# Patient Record
Sex: FEMALE | Race: WHITE | NOT HISPANIC OR LATINO | Employment: OTHER | URBAN - METROPOLITAN AREA
[De-identification: names, ages, dates, MRNs, and addresses within clinical notes are randomized per-mention and may not be internally consistent; named-entity substitution may affect disease eponyms.]

---

## 2017-01-11 ENCOUNTER — RESULTS ENCOUNTER (OUTPATIENT)
Dept: GASTROENTEROLOGY | Facility: CLINIC | Age: 82
End: 2017-01-11

## 2017-01-11 ENCOUNTER — TELEPHONE (OUTPATIENT)
Dept: GASTROENTEROLOGY | Facility: CLINIC | Age: 82
End: 2017-01-11

## 2017-01-11 DIAGNOSIS — R19.7 DIARRHEA, UNSPECIFIED TYPE: ICD-10-CM

## 2017-01-11 DIAGNOSIS — R19.7 DIARRHEA, UNSPECIFIED TYPE: Primary | ICD-10-CM

## 2017-01-11 RX ORDER — SACCHAROMYCES BOULARDII 250 MG
250 CAPSULE ORAL 2 TIMES DAILY
Qty: 60 CAPSULE | Refills: 3 | Status: SHIPPED | OUTPATIENT
Start: 2017-01-11 | End: 2020-10-01

## 2017-01-11 NOTE — TELEPHONE ENCOUNTER
"Call to pt.  States that for past 2-3 days has had watery orange diarrhea.  States cannot count how many, \"but its a lot.\"  States can't control.  Denies abd pain or fever.  States has no appetite - has not eaten today.  Has not tried any OTC rx  - states has no Imodium on hand.  Pt asking \"for something\" to control diarrhea.  Advise pt will send message to Dr Franco.  Pt verb understanding.  "

## 2017-01-11 NOTE — TELEPHONE ENCOUNTER
----- Message from Tatiana Perez sent at 1/11/2017 11:59 AM EST -----  Regarding: LOOSE STOOLS  Contact: [phone]  DR. PEARSON - PT CALLED STATED HAVING LOOSE STOOL AND ASKING FOR MED.. PHARM# [phone]

## 2017-01-11 NOTE — TELEPHONE ENCOUNTER
Recommend checking stool studies prior to starting medication - orders in chart - rec trial probiotic (florastor) in interim - sent to pharmacy

## 2017-01-11 NOTE — TELEPHONE ENCOUNTER
Called pt and advised per Dr Franco that she recommends checking stool studies prior to starting medication - she recommends to trial probiotic ( florastor) in the meantime and she has sent this to her pharmacy.  Advised would have stool kit at the  with her name on it.  Pt verb understanding and states her  will  the kit tomorrow.

## 2017-01-19 DIAGNOSIS — R19.7 DIARRHEA, UNSPECIFIED TYPE: Primary | ICD-10-CM

## 2017-01-20 LAB
C DIFF TOX A+B STL QL IA: NEGATIVE
NTI FECAL TRPT (PP ORANGE): NORMAL

## 2017-01-26 ENCOUNTER — TELEPHONE (OUTPATIENT)
Dept: GASTROENTEROLOGY | Facility: CLINIC | Age: 82
End: 2017-01-26

## 2017-01-26 NOTE — TELEPHONE ENCOUNTER
----- Message from Valorie Franco MD sent at 1/21/2017  8:42 AM EST -----  Call pt with results.  Your stool studies were normal (negative).

## 2020-10-01 ENCOUNTER — OFFICE VISIT (OUTPATIENT)
Dept: NEUROLOGY | Facility: CLINIC | Age: 85
End: 2020-10-01

## 2020-10-01 VITALS
DIASTOLIC BLOOD PRESSURE: 72 MMHG | HEIGHT: 61 IN | BODY MASS INDEX: 25.49 KG/M2 | OXYGEN SATURATION: 99 % | WEIGHT: 135 LBS | SYSTOLIC BLOOD PRESSURE: 110 MMHG | HEART RATE: 85 BPM

## 2020-10-01 DIAGNOSIS — R41.3 MEMORY LOSS: Primary | ICD-10-CM

## 2020-10-01 DIAGNOSIS — F02.80 LATE ONSET ALZHEIMER'S DISEASE WITHOUT BEHAVIORAL DISTURBANCE (HCC): ICD-10-CM

## 2020-10-01 DIAGNOSIS — G30.1 LATE ONSET ALZHEIMER'S DISEASE WITHOUT BEHAVIORAL DISTURBANCE (HCC): ICD-10-CM

## 2020-10-01 PROBLEM — K25.9 STOMACH ULCER: Status: ACTIVE | Noted: 2020-10-01

## 2020-10-01 PROBLEM — R00.0 TACHYCARDIA: Status: ACTIVE | Noted: 2019-08-28

## 2020-10-01 PROCEDURE — 99204 OFFICE O/P NEW MOD 45 MIN: CPT | Performed by: PSYCHIATRY & NEUROLOGY

## 2020-10-01 RX ORDER — OMEPRAZOLE 20 MG/1
20 CAPSULE, DELAYED RELEASE ORAL DAILY
COMMUNITY

## 2020-10-01 NOTE — PROGRESS NOTES
CC: [facility]    HPI:  Isabella Velez is a  87 y.o.  right-handed white female who was sent for neurological consultation by Iris Burnett NP regarding mild cognitive impairment.  The patient's  was with her on her evaluation.  Her history and his history do not match.  When asked about memory loss the patient states she does not have any trouble with her memory and she does not have any trouble medically at all.  According to her  she has had probably about 5 years of slowly progressive memory loss.  He states that the memory fluctuates a little bit but both agree that it does not seem to affect her activities of daily living at home.  In other words she still gets up, cleans up, dresses herself, cooks, cleans, does dishes and washes clothes.  She drives very little since her  has told her not to drive.  She stays at home mostly and he goes out to shop.  He takes care of the finances.  There are no unusual behaviors according to the  such as nocturnal restlessness or delusions or hallucinations.  The patient recalls no one in the family with memory loss.  She states her parents  probably in their late 80s but she did not have a specific age for either of them.  She had 3 brothers and 2 sisters some of whom are .  She states none of them had memory trouble that she recalls.  She denies any balance trouble or bowel or bladder dysfunction.    Reviewing her chart back in  she had a CT scan of the brain which was normal.  I reviewed the films and in fact there was really very little brain atrophy present.  She had some laboratory testing including a TSH which was normal at 2.97, Red cell folate of 1085 but the B12 level was low at 162.  Neither of them have any recollection of the B12 level being low and the patient states that she never got B12 shots or was put on B12 supplementation orally.  She denies being on any vitamins currently.  Upon review of labs there does not seem to be  history of macrocytic anemia with most recent CBC which I can find in the chart from 2016 showed hemoglobin 14 and MCV 89.    She denies a history of significant head trauma meningitis seizure stroke or syncope.  She denies family history of memory troubles stroke brain tumor brain hemorrhage or aneurysm of a brain artery.  Very specifically she denies having any motor vehicle accidents.  Her  who is seated next to her did not have any additional comments to make besides the memory loss history which the patient was in denial of.  The patient does have history of gastric ulcers and remains on omeprazole but other medications have been stopped including antihypertensives and a statin.  The patient apparently lost weight and blood pressure improved.    There is no particular complaint of sleep disturbances or daytime drowsiness.  Depression does not seem evident.    Past Medical History:   Diagnosis Date   • Anemia    • Arthritis    • Diverticulosis    • Esophagitis, Platter grade D    • GERD (gastroesophageal reflux disease)    • Hemorrhoids    • Hiatal hernia    • Hyperlipidemia    • Hypertension    • Multiple gastric ulcers          Past Surgical History:   Procedure Laterality Date   • CATARACT EXTRACTION     • CHOLECYSTECTOMY     • COLONOSCOPY  05/16/2008    hemorrhoids, tics   • COLONOSCOPY  07/05/2015    TA and TVA polyps, tics in sig colon   • ENDOSCOPY N/A 11/1/2016    Procedure: ESOPHAGOGASTRODUODENOSCOPY;  Surgeon: Valorie Franco MD;  Location: [facility] ENDOSCOPY;  Service:    • UPPER GASTROINTESTINAL ENDOSCOPY  07/01/2015    LA grade D reflux esophagitis, erythamous mucosa in stomach, path fibinopurulent del and granulation tissue consistent w/ ulcer bed, minimal squamouos mucosa w/ acute inflam   • UPPER GASTROINTESTINAL ENDOSCOPY  03/22/2015    HH, LA Grade D erosive esophagitis, gastric ulcers           Current Outpatient Medications:   •  omeprazole (priLOSEC) 20 MG capsule, Take 20 mg by  "mouth Daily., Disp: , Rfl:       History reviewed. No pertinent family history.      Social History     Socioeconomic History   • Marital status:      Spouse name: Not on file   • Number of children: Not on file   • Years of education: Not on file   • Highest education level: Not on file   Tobacco Use   • Smoking status: Never Smoker   • Smokeless tobacco: Never Used   Substance and Sexual Activity   • Alcohol use: No   • Drug use: No         Allergies   Allergen Reactions   • Penicillins          Pain Scale: 0/10        ROS:  Review of Systems   Constitutional: Negative for activity change, appetite change and fatigue.   HENT: Positive for sore throat. Negative for ear pain, facial swelling and hearing loss.    Eyes: Positive for itching. Negative for pain and redness.   Respiratory: Negative for cough, choking and shortness of breath.    Cardiovascular: Negative for chest pain and leg swelling.   Gastrointestinal: Negative for abdominal pain, nausea and vomiting.   Endocrine: Negative for cold intolerance and heat intolerance.   Skin: Negative for color change, pallor and rash.   Allergic/Immunologic: Negative for environmental allergies and food allergies.   Neurological: Negative for dizziness, tremors, seizures, syncope, facial asymmetry, speech difficulty, weakness, light-headedness, numbness and headaches.   Psychiatric/Behavioral: Negative for agitation, behavioral problems, confusion, decreased concentration, dysphoric mood, hallucinations, self-injury, sleep disturbance and suicidal ideas. The patient is not nervous/anxious and is not hyperactive.          I have reviewed and agree with the above ROS completed by the medical assistant.      Physical Exam:  Vitals:    10/01/20 0954   BP: 110/72   Pulse: 85   SpO2: 99%   Weight: 61.2 kg (135 lb)   Height: 154.9 cm (61\")     Orthostatic BP:    Body mass index is 25.51 kg/m².    Physical Exam  General: Well-developed white female no acute " distress  HEENT: Normocephalic no evidence of trauma.  Discs difficult to see due to reflection off of her lenses.  Throat negative  Neck: Supple.  No thyromegaly.  No cervical bruits.  Heart: Regular rate and rhythm without murmurs.  No pedal edema.  Extremities: Radial pulses were not found but ulnar pulses were found and were strong and simultaneous.      Neurological Exam:   Mental Status: Awake, alert, oriented 5/10 on Mini-Mental state exam.  Conversant without evidence of an affective disorder, thought disorder, delusions or hallucinations.  She remained fairly cordial throughout the exam.  Attention span and concentration was somewhat in question as the patient refused to do animal naming..  HCF: No aphasia or dysarthria.  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  Immediate recall intact but she recalled 0 items of 3 and 3 minutes.  Knowledge of recent events questionably impaired.  Mini-Mental status score 14/30 and clock drawing was 1/4.  She refused animal naming.  CN: I:   II: Visual fields full without left inattention   III, IV, VI: Eye movements intact without nystagmus or ptosis.  Pupils equal round and reactive to light.   V,VII: Light touch and pinprick intact all 3 divisions of V.  Facial muscles symmetrical.   VIII: Hearing intact to finger rub on the right absent on the left (apparently has a hole in her eardrum according to her )   IX,X: Soft palate elevates symmetrically   XI: Sternomastoid and trapezius are strong.   XII: Tongue midline without atrophy or fasciculations  Motor: Normal tone and bulk in the upper and lower extremities   Power testing: Good power in all muscles tested arms and legs  Reflexes: Upper extremities: +2 diffusely        Lower extremities: +2 diffusely except right ankle jerk absent        Toe signs: Downgoing bilaterally  Sensory: Light touch: Diffusely intact arms and legs        Pinprick: Diffusely  intact arms and legs        Vibration: Intact at the ankles        Position: Intact at the great toes    Cerebellar: Finger-to-nose: Intact           Rapid movement: Intact           Heel-to-shin: Intact  Gait and Station: Casual toe heel and tandem walk normal.  No Romberg no drift    Results:      Lab Results   Component Value Date    GLUCOSE 121 (H) 08/29/2016    BUN 17 03/06/2018    CREATININE 0.8 03/06/2018    EGFRIFNONA 56 (L) 08/29/2016    EGFRIFAFRI  08/29/2016      Comment:      <15 Indicative of kidney failure.    BCR 20.0 03/06/2018    CO2 26 03/06/2018    CALCIUM 10.1 03/06/2018    ALBUMIN 4.2 03/06/2018    LABIL2 1.7 03/06/2018    AST 18 03/06/2018    ALT 7 (L) 03/06/2018       Lab Results   Component Value Date    WBC 7.81 08/29/2016    HGB 14.3 08/29/2016    HCT 42.1 08/29/2016    MCV 89.6 08/29/2016     08/29/2016         .No results found for: RPR      Lab Results   Component Value Date    TSH 2.97 07/02/2015         Lab Results   Component Value Date    VZDBBXZE83 162 (L) 07/02/2015         No results found for: FOLATE      Lab Results   Component Value Date    HGBA1C 5.6 03/06/2018         Lab Results   Component Value Date    GLUCOSE 121 (H) 08/29/2016    BUN 17 03/06/2018    CREATININE 0.8 03/06/2018    EGFRIFNONA 56 (L) 08/29/2016    EGFRIFAFRI  08/29/2016      Comment:      <15 Indicative of kidney failure.    BCR 20.0 03/06/2018    K 4.9 03/06/2018    CO2 26 03/06/2018    CALCIUM 10.1 03/06/2018    ALBUMIN 4.2 03/06/2018    LABIL2 1.7 03/06/2018    AST 18 03/06/2018    ALT 7 (L) 03/06/2018         Lab Results   Component Value Date    WBC 7.81 08/29/2016    HGB 14.3 08/29/2016    HCT 42.1 08/29/2016    MCV 89.6 08/29/2016     08/29/2016       CT brain images reviewed electronically      Assessment:   1.  Alzheimer's disease, late onset without behavioral disturbance.  It is of interest that functionally she seems to perform at a higher level than 1 would expect for a Mini-Mental  status score of 14.  2.  History of B12 deficiency; however, patient has no evidence of peripheral neuropathy or a dorsal column deficit to suggest true B12 deficiency and no macrocytic anemia was present on previous CBC 2016        Plan:  1.  MRI of the brain  2.  Labs including sed rate, RPR, B12 and folate, thyroid functions  3.  Follow-up with 1 of the nurse practitioners in about a month or 6 weeks  4.  We discussed use of cholinesterase inhibitors such as donepezil.  I asked them to consider these things even side effects most commonly of gastrointestinal origin such as diarrhea or nausea.  This topic should be discussed again when she returns          >50% of this 45-minute consult was spent counseling the patient and her  on diagnostic procedures and medication options for treatment.  Given her dyspraxic findings on Mini-Mental state/clock draw I agree with her abstaining from driving.              Dictated utilizing Dragon dictation.

## 2020-10-21 ENCOUNTER — HOSPITAL ENCOUNTER (OUTPATIENT)
Dept: MRI IMAGING | Facility: HOSPITAL | Age: 85
Discharge: HOME | End: 2020-10-21
Admitting: PSYCHIATRY & NEUROLOGY

## 2020-10-21 DIAGNOSIS — R41.3 MEMORY LOSS: ICD-10-CM

## 2020-10-21 LAB — CREAT BLDA-MCNC: 0.8 MG/DL (ref 0.6–1.3)

## 2020-10-21 PROCEDURE — 0 GADOBENATE DIMEGLUMINE 529 MG/ML SOLUTION: Performed by: PSYCHIATRY & NEUROLOGY

## 2020-10-21 PROCEDURE — A9577 INJ MULTIHANCE: HCPCS | Performed by: PSYCHIATRY & NEUROLOGY

## 2020-10-21 PROCEDURE — 82565 ASSAY OF CREATININE: CPT

## 2020-10-21 PROCEDURE — 70553 MRI BRAIN STEM W/O & W/DYE: CPT

## 2020-10-21 RX ADMIN — GADOBENATE DIMEGLUMINE 13 ML: 529 INJECTION, SOLUTION INTRAVENOUS at 08:48

## 2020-11-02 ENCOUNTER — TELEPHONE (OUTPATIENT)
Dept: NEUROLOGY | Facility: CLINIC | Age: 85
End: 2020-11-02

## 2020-11-02 NOTE — TELEPHONE ENCOUNTER
PT' S  YUMIKO SHANE IS CALLING WANTING THE RESULTS OF THE MRI BRAIN THAT WAS DONE ON 10- AT [facility]. PLEASE CALL HIM BACK -[phone]

## 2020-11-02 NOTE — TELEPHONE ENCOUNTER
I assume he has permission to discuss his wife's medical issues...    The MRI shows ager related atrophy and small vessel changes. 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 This can be further discussed at her F/U with NP in about 2.5 weeks    mariana

## 2020-11-02 NOTE — TELEPHONE ENCOUNTER
Spoke with patient's  gave MRI results. Told him to have patient complete labs before follow up appt with Roberta.

## 2020-11-12 ENCOUNTER — TELEPHONE (OUTPATIENT)
Dept: NEUROLOGY | Facility: CLINIC | Age: 85
End: 2020-11-12

## 2020-11-16 ENCOUNTER — LAB (OUTPATIENT)
Dept: LAB | Facility: HOSPITAL | Age: 85
End: 2020-11-16

## 2020-11-16 DIAGNOSIS — R41.3 MEMORY LOSS: ICD-10-CM

## 2020-11-16 LAB
CREAT SERPL-MCNC: 0.92 MG/DL (ref 0.57–1)
ERYTHROCYTE [SEDIMENTATION RATE] IN BLOOD: 4 MM/HR (ref 0–30)
FOLATE SERPL-MCNC: 4.12 NG/ML (ref 4.78–24.2)
GFR SERPL CREATININE-BSD FRML MDRD: 58 ML/MIN/1.73
RPR SER QL: NORMAL
T4 FREE SERPL-MCNC: 1.09 NG/DL (ref 0.93–1.7)
TSH SERPL DL<=0.05 MIU/L-ACNC: 1.08 UIU/ML (ref 0.27–4.2)
VIT B12 BLD-MCNC: 227 PG/ML (ref 211–946)

## 2020-11-16 PROCEDURE — 82565 ASSAY OF CREATININE: CPT

## 2020-11-16 PROCEDURE — 36415 COLL VENOUS BLD VENIPUNCTURE: CPT

## 2020-11-16 PROCEDURE — 82607 VITAMIN B-12: CPT | Performed by: PSYCHIATRY & NEUROLOGY

## 2020-11-16 PROCEDURE — 84443 ASSAY THYROID STIM HORMONE: CPT | Performed by: PSYCHIATRY & NEUROLOGY

## 2020-11-16 PROCEDURE — 84439 ASSAY OF FREE THYROXINE: CPT | Performed by: PSYCHIATRY & NEUROLOGY

## 2020-11-16 PROCEDURE — 85652 RBC SED RATE AUTOMATED: CPT | Performed by: PSYCHIATRY & NEUROLOGY

## 2020-11-16 PROCEDURE — 82746 ASSAY OF FOLIC ACID SERUM: CPT | Performed by: PSYCHIATRY & NEUROLOGY

## 2020-11-16 PROCEDURE — 86592 SYPHILIS TEST NON-TREP QUAL: CPT | Performed by: PSYCHIATRY & NEUROLOGY

## 2020-11-18 ENCOUNTER — TELEPHONE (OUTPATIENT)
Dept: NEUROLOGY | Facility: CLINIC | Age: 85
End: 2020-11-18

## 2020-11-18 NOTE — TELEPHONE ENCOUNTER
----- Message from Rick Santana MD sent at 11/16/2020  7:40 PM EST -----  Damesha,    She needs to be on supplemental vitamin B12 and folic acid.  I do not think she is on either 1.  Dosage is for the B12 1 mg (1000 mcg) daily and for folic acid it is 400 mcg daily.  These are available over-the-counter.  Please let her know    GNS

## 2020-11-18 NOTE — TELEPHONE ENCOUNTER
Spoke with patient informed her that she need to starting taking a vitamin b12 1mg and folic acid 400 mcg daily. Told her both are available over the counter.

## 2020-11-19 ENCOUNTER — OFFICE VISIT (OUTPATIENT)
Dept: NEUROLOGY | Facility: CLINIC | Age: 85
End: 2020-11-19

## 2020-11-19 VITALS
OXYGEN SATURATION: 98 % | HEIGHT: 61 IN | WEIGHT: 136 LBS | DIASTOLIC BLOOD PRESSURE: 90 MMHG | BODY MASS INDEX: 25.68 KG/M2 | SYSTOLIC BLOOD PRESSURE: 128 MMHG | HEART RATE: 95 BPM

## 2020-11-19 DIAGNOSIS — G30.1 LATE ONSET ALZHEIMER'S DISEASE WITHOUT BEHAVIORAL DISTURBANCE (HCC): Primary | ICD-10-CM

## 2020-11-19 DIAGNOSIS — E53.8 FOLATE DEFICIENCY: ICD-10-CM

## 2020-11-19 DIAGNOSIS — E53.8 B12 DEFICIENCY: ICD-10-CM

## 2020-11-19 DIAGNOSIS — F02.80 LATE ONSET ALZHEIMER'S DISEASE WITHOUT BEHAVIORAL DISTURBANCE (HCC): Primary | ICD-10-CM

## 2020-11-19 PROCEDURE — 99214 OFFICE O/P EST MOD 30 MIN: CPT | Performed by: NURSE PRACTITIONER

## 2020-11-19 RX ORDER — DONEPEZIL HYDROCHLORIDE 5 MG/1
5 TABLET, FILM COATED ORAL NIGHTLY
Qty: 30 TABLET | Refills: 0 | Status: SHIPPED | OUTPATIENT
Start: 2020-11-19 | End: 2020-11-19

## 2020-11-19 RX ORDER — DONEPEZIL HYDROCHLORIDE 5 MG/1
5 TABLET, FILM COATED ORAL NIGHTLY
Qty: 90 TABLET | Refills: 3 | Status: SHIPPED | OUTPATIENT
Start: 2020-11-19 | End: 2021-11-19

## 2020-11-19 RX ORDER — LANOLIN ALCOHOL/MO/W.PET/CERES
400 CREAM (GRAM) TOPICAL DAILY
Qty: 100 TABLET | Refills: 3 | Status: SHIPPED | OUTPATIENT
Start: 2020-11-19 | End: 2021-11-19

## 2020-11-19 NOTE — PROGRESS NOTES
DOS: 2020  NAME: Isabella Velez   : 1933  PCP: Iris Burnett    Chief Complaint   Patient presents with   • Memory Loss      Referring MD: No ref. provider found    Neurological Problem:  87 y.o. right-handed female with a Hx of hypertension, hyperlipidemia, diverticulosis who presents today for dementia follow-up.  Patient and problem are new to me.  She is accompanied by her .  History is provided by the , patient, and review of records as summarized below.    Interval History:  Mrs. Velez was previously seen by Dr. Santana on 10/1/2020 in consultation for memory loss.  Her  accompanied her to the appointment and provided most of the history.  He had reported that the patient had been showing signs of progressive memory loss over a span of 5 years.  They denied any issues of ADLs at home however the  relates taking care of the finances and decreased her ability to drive.  Has been reported no issues with behavioral disturbances at night or restlessness, delusions, or hallucinations.  She did have an MMSE that was performed during the office appointment in which she scored a 14.  Dr. Santana felt she had late onset Alzheimer's disease without behavioral disturbance however functionally she seemed to perform at a higher level and then what her MMSE revealed.  He ordered an MRI brain without and labs including an ESR, RPR, B12, folate, and thyroid function.  There is also discussion of starting a cholinesterase inhibitor with the patient and her .  He recommended that she not drive due to her dyspraxic findings on her MMSE/clock draw.    Today she presents with her  and has shown no change since her last office visit with Dr. Santana.   reports she continues to dress and bathe herself.  He does most of the cooking and controls all of their finances.  Reports she mainly sits in her chair and watches TV as he does not want her going out due to the COVID-19  "pandemic.  She has not driven since seeing Dr. Santana last.  She has not had any behavioral disturbances during the day or at night.  She has no trouble falling asleep at night.  He reports her memory loss has been about the same over the last month.  She is feeling a little nauseous this morning but it is because she was nervous to come to the appointment.    Review of Systems:        Review of Systems   Constitutional: Negative for activity change, appetite change and unexpected weight change.   HENT: Negative for facial swelling, trouble swallowing and voice change.    Eyes: Negative for photophobia, pain and visual disturbance.   Respiratory: Negative for chest tightness, shortness of breath and wheezing.    Cardiovascular: Negative for chest pain, palpitations and leg swelling.   Gastrointestinal: Negative for abdominal pain, nausea and vomiting.   Endocrine: Negative for polydipsia and polyphagia.   Musculoskeletal: Negative for arthralgias, back pain, gait problem, joint swelling, myalgias, neck pain and neck stiffness.   Neurological: Negative for dizziness, tremors, seizures, syncope, facial asymmetry, speech difficulty, weakness, light-headedness, numbness and headaches.   Hematological: Does not bruise/bleed easily.   Psychiatric/Behavioral: Negative for agitation, behavioral problems, confusion, decreased concentration, dysphoric mood, hallucinations, self-injury, sleep disturbance and suicidal ideas. The patient is not nervous/anxious and is not hyperactive.        \"The following portions of the patient's history were reviewed and updated as appropriate: allergies, current medications, past family history, past medical history, past social history, past surgical history and problem list.\"    Laboratory Results:             Lab Results   Component Value Date    HGBA1C 5.6 03/06/2018     No results found for: CHOL  Lab Results   Component Value Date    HDL 53 03/06/2018    HDL 33 (L) 07/02/2015     Lab " Results   Component Value Date    LDL 67 03/06/2018    LDL 44 07/02/2015     Lab Results   Component Value Date    TRIG 76 03/06/2018    TRIG 108 07/02/2015     No components found for: CHOLHDL  Lab Results   Component Value Date    RPR Non-Reactive 11/16/2020     Lab Results   Component Value Date    TSH 1.080 11/16/2020     Lab Results   Component Value Date    [id number] 227 11/16/2020     Lab Results   Component Value Date    GLUCOSE 121 (H) 08/29/2016    BUN 17 03/06/2018    CREATININE 0.92 11/16/2020    EGFRIFNONA 58 (L) 11/16/2020    EGFRIFAFRI  08/29/2016      Comment:      <15 Indicative of kidney failure.    BCR 20.0 03/06/2018    K 4.9 03/06/2018    CO2 26 03/06/2018    CALCIUM 10.1 03/06/2018    ALBUMIN 4.2 03/06/2018    LABIL2 1.7 03/06/2018    AST 18 03/06/2018    ALT 7 (L) 03/06/2018     Lab Results   Component Value Date    WBC 7.81 08/29/2016    HGB 14.3 08/29/2016    HCT 42.1 08/29/2016    MCV 89.6 08/29/2016     08/29/2016     Lab Results   Component Value Date    INR 1.1 03/21/2015    PROTIME 12.7 (H) 03/21/2015       ESR 4, LH 4.12    Physical Examination:   mRS:   General Appearance:   Frail elderly female, well nourished, well groomed, alert, and cooperative.  HEENT: Normocephalic. Atraumatic. PERRL.   Cardiac: Regular rate and rhythm.   Extremities:    No obvious edema.  Respiratory:   Even and unlabored.    Neurological examination:  Higher Integrative  Function: Oriented to time, place and person. Normal registration, attention span and concentration. Normal language including spontaneous speech, repetition.  CN II: Normal visual acuity and visual fields.    CN III IV VI: Extraocular movements are full without nystagmus.   CN V: Normal facial sensation and strength of muscles of mastication.  CN VII: Facial movements are symmetric. No weakness.  CN VIII:   Auditory acuity is normal.  CN IX & X:   Symmetric palatal movement.  CN XI: Sternocleidomastoid and trapezius are normal.  No  weakness.  CN XII:   The tongue is midline.  No atrophy or fasciculations.  Motor: Normal muscle strength, bulk and tone in upper and lower extremities.  No fasciculations, rigidity, spasticity, or abnormal movements.  Sensation: Normal to light touch,  vibration, decreased temperature on the right side of the face  Station and Gait: Mildly stooped posture, quick paced steps, rocked back and forth to stand from chair on first attempt    Coordination: Finger to nose test shows no dysmetria.  Rapid alternating movements are normal.      Impression/Assessment:    Mrs. Velez presents today for Alzheimer's dementia follow-up.  She has shown no cognitive or physical worsening since seeing Dr. Santana last in October.  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  We also reviewed her labs which revealed a normal ESR, RPR, and thyroid functions but did show that she had a low normal B12 of 227 and a low folate level of 4.12.  I discussed with her and her  about starting her on an Aricept trial and both are agreeable to this.  We will start Aricept 5 mg x 1 month and then if she is tolerating will increase to 10 mg.  Side effects were reviewed and discussed, they will call me if she starts to experience any of these.  I also am going to start her on cyanocobalamin 1000mcg p.o. daily and folic acid 400mcg daily.  I would like for her to follow-up with her PCP for further surveillance of her B12 and folate deficiency.  We will perform a MMSE with her next follow-up with me in April. They will call if she shows any worsening of her behavior.  I instructed that she not ever drive again or be left alone in the house for an extended period of time.  The  also had questions about Medicare and Medicaid and their options.  I asked that they reached out to  their PCP to see if they have any resources about the different insurance coverage is and what they would qualify for.    Plan:     Start Aricept 5mg PO daily x1 month and then increase to 10mg daily if tolerating, side effects were reviewed and they will call me back with any concerns.  Start Folic acid 400mcg daily PO  Start cyanocobalamin 1000mcg p.o. daily  Contact PCP for further surveillance of folate and B12 deficiency  She should not drive or be left alone for an extended period of time.  Will perform MMSE at next follow up appointment.  F/U with me in April 2021.    Diagnoses and all orders for this visit:    1. Late onset Alzheimer's disease without behavioral disturbance (CMS/HCC) (Primary)    2. B12 deficiency    3. Folate deficiency    Other orders  -     folic acid (FOLVITE) 400 MCG tablet; Take 1 tablet by mouth Daily.  Dispense: 100 tablet; Refill: 3  -     cyanocobalamin ([facility] Vitamin B-12) 1000 MCG tablet; Take 1 tablet by mouth Daily.  Dispense: 100 tablet; Refill: 2  -     donepezil (Aricept) 5 MG tablet; Take 1 tablet by mouth Every Night.  Dispense: 30 tablet; Refill: 0      MDM  Reviewed: previous chart and vitals  Reviewed previous: labs and MRI  Interpretation: labs and MRI        KO Cruz

## 2020-11-19 NOTE — PATIENT INSTRUCTIONS
Donepezil tablets  What is this medicine?  DONEPEZIL (de la fuente NEP e zil) is used to treat mild to moderate dementia caused by Alzheimer's disease.  This medicine may be used for other purposes; ask your health care provider or pharmacist if you have questions.  COMMON BRAND NAME(S): Aricept  What should I tell my health care provider before I take this medicine?  They need to know if you have any of these conditions:  · asthma or other lung disease  · difficulty passing urine  · head injury  · heart disease  · history of irregular heartbeat  · liver disease  · seizures (convulsions)  · stomach or intestinal disease, ulcers or stomach bleeding  · an unusual or allergic reaction to donepezil, other medicines, foods, dyes, or preservatives  · pregnant or trying to get pregnant  · breast-feeding  How should I use this medicine?  Take this medicine by mouth with a glass of water. Follow the directions on the prescription label. You may take this medicine with or without food. Take this medicine at regular intervals. This medicine is usually taken before bedtime. Do not take it more often than directed. Continue to take your medicine even if you feel better. Do not stop taking except on your doctor's advice.  If you are taking the 23 mg donepezil tablet, swallow it whole; do not cut, crush, or chew it.  Talk to your pediatrician regarding the use of this medicine in children. Special care may be needed.  Overdosage: If you think you have taken too much of this medicine contact a poison control center or emergency room at once.  NOTE: This medicine is only for you. Do not share this medicine with others.  What if I miss a dose?  If you miss a dose, take it as soon as you can. If it is almost time for your next dose, take only that dose, do not take double or extra doses.  What may interact with this medicine?  Do not take this medicine with any of the following medications:  · certain medicines for fungal infections like  itraconazole, fluconazole, posaconazole, and voriconazole  · cisapride  · dextromethorphan; quinidine  · dronedarone  · pimozide  · quinidine  · thioridazine  This medicine may also interact with the following medications:  · antihistamines for allergy, cough and cold  · atropine  · bethanechol  · carbamazepine  · certain medicines for bladder problems like oxybutynin, tolterodine  · certain medicines for Parkinson's disease like benztropine, trihexyphenidyl  · certain medicines for stomach problems like dicyclomine, hyoscyamine  · certain medicines for travel sickness like scopolamine  · dexamethasone  · dofetilide  · ipratropium  · NSAIDs, medicines for pain and inflammation, like ibuprofen or naproxen  · other medicines for Alzheimer's disease  · other medicines that prolong the QT interval (cause an abnormal heart rhythm)  · phenobarbital  · phenytoin  · rifampin, rifabutin or rifapentine  · ziprasidone  This list may not describe all possible interactions. Give your health care provider a list of all the medicines, herbs, non-prescription drugs, or dietary supplements you use. Also tell them if you smoke, drink alcohol, or use illegal drugs. Some items may interact with your medicine.  What should I watch for while using this medicine?  Visit your doctor or health care professional for regular checks on your progress. Check with your doctor or health care professional if your symptoms do not get better or if they get worse.  You may get drowsy or dizzy. Do not drive, use machinery, or do anything that needs mental alertness until you know how this drug affects you.  What side effects may I notice from receiving this medicine?  Side effects that you should report to your doctor or health care professional as soon as possible:  · allergic reactions like skin rash, itching or hives, swelling of the face, lips, or tongue  · feeling faint or lightheaded, falls  · loss of bladder control  · seizures  · signs and  symptoms of a dangerous change in heartbeat or heart rhythm like chest pain; dizziness; fast or irregular heartbeat; palpitations; feeling faint or lightheaded, falls; breathing problems  · signs and symptoms of infection like fever or chills; cough; sore throat; pain or trouble passing urine  · signs and symptoms of liver injury like dark yellow or brown urine; general ill feeling or flu-like symptoms; light-colored stools; loss of appetite; nausea; right upper belly pain; unusually weak or tired; yellowing of the eyes or skin  · slow heartbeat or palpitations  · unusual bleeding or bruising  · vomiting  Side effects that usually do not require medical attention (report to your doctor or health care professional if they continue or are bothersome):  · diarrhea, especially when starting treatment  · headache  · loss of appetite  · muscle cramps  · nausea  · stomach upset  This list may not describe all possible side effects. Call your doctor for medical advice about side effects. You may report side effects to FDA at 7-047-EGN-0432.  Where should I keep my medicine?  Keep out of reach of children.  Store at room temperature between 15 and 30 degrees C (59 and 86 degrees F). Throw away any unused medicine after the expiration date.  NOTE: This sheet is a summary. It may not cover all possible information. If you have questions about this medicine, talk to your doctor, pharmacist, or health care provider.  © 2020 [employer] (2019-12-09 10:33:41)

## 2021-12-06 RX ORDER — LANOLIN ALCOHOL/MO/W.PET/CERES
400 CREAM (GRAM) TOPICAL DAILY
Qty: 100 TABLET | Refills: 3 | OUTPATIENT
Start: 2021-12-06 | End: 2022-12-06

## 2021-12-06 NOTE — TELEPHONE ENCOUNTER
Called [facility] and spoke with April from the answering service. April sts she will send a message to caregivers to call regarding medications.

## 2021-12-06 NOTE — TELEPHONE ENCOUNTER
Called  about refills for folic acid. Per avtar's last note, folate and and B12 to be managed by PCP.   Per , pt does not want to return here.  reports he is having a hard time getting her to appts.  reports that a nurse comes to the house routinely with [facility]. I informed  that I would reach out to them and speak to them about her medications, including donepezil that we prescribe since pt no longer wants to continue receiving care here.   provided number, [phone]

## 2021-12-06 NOTE — TELEPHONE ENCOUNTER
Shelly from [facility] returned my call. Per Shelly, they are currently managing all of pt's medications, including donepezil.

## 2022-04-22 ENCOUNTER — TELEPHONE (OUTPATIENT)
Dept: NEUROLOGY | Facility: CLINIC | Age: 87
End: 2022-04-22

## 2022-04-22 NOTE — TELEPHONE ENCOUNTER
Caller: Logan Velez    Relationship: Emergency Contact; [employer]    Best call back number: [phone]    What was the call regarding: THIS ENCOUNTER HAS BEEN CREATED, PER [facility] [employer], TO MAKE THE OFFICE AWARE THAT PT'S  CALLED TO CANCEL PT'S F/U APPT W/ [facility] ON 5/10/22. PT'S  STATES THEY ARE MOVING PT INTO A NURSING FACILITY & PT WOULD NOT BE ABLE TO COME INTO THE OFFICE FOR HER APPT. PT'S  DECLINED SCHEDULING F/U VISIT.    Do you require a callback: NO    DOCUMENTING PER [facility] [employer] AS PT'S F/U VISIT HAS BEEN CANCELLED W/O RESCHEDULE.